# Patient Record
Sex: FEMALE | Race: WHITE
[De-identification: names, ages, dates, MRNs, and addresses within clinical notes are randomized per-mention and may not be internally consistent; named-entity substitution may affect disease eponyms.]

---

## 2019-06-06 ENCOUNTER — HOSPITAL ENCOUNTER (OUTPATIENT)
Dept: HOSPITAL 47 - RADMAMWWP | Age: 54
Discharge: HOME | End: 2019-06-06
Attending: OBSTETRICS & GYNECOLOGY
Payer: COMMERCIAL

## 2019-06-06 DIAGNOSIS — Z12.31: Primary | ICD-10-CM

## 2019-06-06 PROCEDURE — 77067 SCR MAMMO BI INCL CAD: CPT

## 2019-06-07 NOTE — MM
Reason for exam: screening  (asymptomatic).

Last mammogram was performed 1 year and 2 months ago.



History:

Patient is postmenopausal.

Family history of premenopausal breast cancer in maternal cousin at age 41, breast

cancer in maternal cousin at age 41, and premenopausal breast cancer in maternal 

grandmother at age 40.

Took hormonal contraceptives for 2 years.



Physical Findings:

A clinical breast exam by your physician is recommended on an annual basis and 

results should be correlated with mammographic findings.



MG Screening Mammo w CAD

Bilateral CC and MLO view(s) were taken.

Prior study comparison: April 4, 2018, bilateral MG screening mammo w CAD.  March 7, 2017, bilateral MG screening mammo w CAD.

There are scattered fibroglandular densities.  There is a 3mm group of posterior 

depth right lower inner quadrant calcifications.





ASSESSMENT: Incomplete: need additional imaging evaluation, BI-RAD 0



RECOMMENDATION:

Special view mammogram of the right breast.



[facility] will attempt to contact patient to return for supplemental 

views.

## 2019-06-13 ENCOUNTER — HOSPITAL ENCOUNTER (OUTPATIENT)
Dept: HOSPITAL 47 - RADMAMWWP | Age: 54
Discharge: HOME | End: 2019-06-13
Attending: OBSTETRICS & GYNECOLOGY
Payer: COMMERCIAL

## 2019-06-13 DIAGNOSIS — R92.8: Primary | ICD-10-CM

## 2019-06-13 PROCEDURE — 77065 DX MAMMO INCL CAD UNI: CPT

## 2019-06-13 NOTE — USB
Reason for exam: additional evaluation requested from abnormal screening.



History:

Patient is postmenopausal.

Family history of premenopausal breast cancer in maternal cousin at age 41, breast

cancer in maternal cousin at age 41, and premenopausal breast cancer in maternal 

grandmother at age 40.

Took hormonal contraceptives for 2 years.



US Breast Workup Limited RT

Right limited breast ultrasound including focal area of concern, retroareolar and 

axilla demonstrates a 0.5 x 0.3 x 0.3cm lesion at 3 o'clock.



These results were verbally communicated with the patient and result sheet given 

to the patient on 6/13/19.





ASSESSMENT: Probably benign, BI-RAD 3



RECOMMENDATION:

Follow-up diagnostic mammogram of the right breast in 6 months.

## 2019-06-13 NOTE — MM
Reason for exam: additional evaluation requested from abnormal screening.

Last mammogram was performed less than 1 month ago.



History:

Patient is postmenopausal.

Family history of premenopausal breast cancer in maternal cousin at age 41, breast

cancer in maternal cousin at age 41, and premenopausal breast cancer in maternal 

grandmother at age 40.

Took hormonal contraceptives for 2 years.



Physical Findings:

Nurse did not find any significant physical abnormalities on exam.



MG Work Up Mamm w CAD RT

CC with magnification, LM with magnification, and LM view(s) were taken of the 

right breast.

Prior study comparison: June 6, 2019, bilateral MG screening mammo w CAD.  April 4, 2018, bilateral MG screening mammo w CAD.

Benign appearing calcifications in the right breast at 3 o'clock.



These results were verbally communicated with the patient and result sheet given 

to the patient on 6/13/19.





ASSESSMENT: Incomplete: need additional imaging evaluation, BI-RAD 0



RECOMMENDATION:

Ultrasound of the right breast.

## 2019-09-05 ENCOUNTER — HOSPITAL ENCOUNTER (OUTPATIENT)
Dept: HOSPITAL 47 - RADUSWWP | Age: 54
Discharge: HOME | End: 2019-09-05
Attending: FAMILY MEDICINE
Payer: COMMERCIAL

## 2019-09-05 DIAGNOSIS — N13.30: Primary | ICD-10-CM

## 2019-09-05 PROCEDURE — 76770 US EXAM ABDO BACK WALL COMP: CPT

## 2019-09-06 NOTE — US
EXAMINATION TYPE: US kidneys/renal and bladder

 

DATE OF EXAM: 9/5/2019

 

COMPARISON: CT 2015

 

CLINICAL HISTORY: N10 ACUTE PYELONEPHRITIS. UTI, back pain

 

EXAM MEASUREMENTS:

 

Right Kidney:  10.3 x 4.9 x 5.2 cm

Left Kidney: 10.7 x 5.4 x 4.8 cm

 

 

 

Right Kidney: mild hydronephrosis  

Left Kidney: no hydronephrosis or masses seen  

Bladder: wnl

**Bilateral Jets seen: yes

 

There is no evidence for hydronephrosis on the left.  No nephrolithiasis is seen.  No masses are iden
tified.  The urinary bladder is anechoic.  Bilateral ureteral jets are seen.

 

 

IMPRESSION: Mild right-sided hydronephrosis is seen. No nephrolithiasis of either kidney. No left-kimi
ed hydronephrosis.

## 2019-10-06 ENCOUNTER — HOSPITAL ENCOUNTER (EMERGENCY)
Dept: HOSPITAL 47 - EC | Age: 54
Discharge: HOME | End: 2019-10-06
Payer: COMMERCIAL

## 2019-10-06 VITALS — TEMPERATURE: 97.9 F

## 2019-10-06 VITALS — DIASTOLIC BLOOD PRESSURE: 78 MMHG | SYSTOLIC BLOOD PRESSURE: 128 MMHG | RESPIRATION RATE: 18 BRPM | HEART RATE: 78 BPM

## 2019-10-06 DIAGNOSIS — Z90.49: ICD-10-CM

## 2019-10-06 DIAGNOSIS — N13.2: Primary | ICD-10-CM

## 2019-10-06 DIAGNOSIS — Z87.891: ICD-10-CM

## 2019-10-06 DIAGNOSIS — K44.9: ICD-10-CM

## 2019-10-06 DIAGNOSIS — Z88.5: ICD-10-CM

## 2019-10-06 DIAGNOSIS — R16.0: ICD-10-CM

## 2019-10-06 LAB
ALBUMIN SERPL-MCNC: 4.5 G/DL (ref 3.5–5)
ALP SERPL-CCNC: 55 U/L (ref 38–126)
ALT SERPL-CCNC: 23 U/L (ref 9–52)
ANION GAP SERPL CALC-SCNC: 8 MMOL/L
AST SERPL-CCNC: 26 U/L (ref 14–36)
BASOPHILS # BLD AUTO: 0 K/UL (ref 0–0.2)
BASOPHILS NFR BLD AUTO: 1 %
BUN SERPL-SCNC: 15 MG/DL (ref 7–17)
CALCIUM SPEC-MCNC: 9.6 MG/DL (ref 8.4–10.2)
CHLORIDE SERPL-SCNC: 109 MMOL/L (ref 98–107)
CO2 SERPL-SCNC: 25 MMOL/L (ref 22–30)
EOSINOPHIL # BLD AUTO: 0.1 K/UL (ref 0–0.7)
EOSINOPHIL NFR BLD AUTO: 2 %
ERYTHROCYTE [DISTWIDTH] IN BLOOD BY AUTOMATED COUNT: 4.22 M/UL (ref 3.8–5.4)
ERYTHROCYTE [DISTWIDTH] IN BLOOD: 14.4 % (ref 11.5–15.5)
GLUCOSE SERPL-MCNC: 103 MG/DL (ref 74–99)
HCT VFR BLD AUTO: 38.3 % (ref 34–46)
HGB BLD-MCNC: 13 GM/DL (ref 11.4–16)
HYALINE CASTS UR QL AUTO: 7 /LPF (ref 0–2)
LYMPHOCYTES # SPEC AUTO: 1.3 K/UL (ref 1–4.8)
LYMPHOCYTES NFR SPEC AUTO: 27 %
MCH RBC QN AUTO: 30.8 PG (ref 25–35)
MCHC RBC AUTO-ENTMCNC: 34 G/DL (ref 31–37)
MCV RBC AUTO: 90.6 FL (ref 80–100)
MONOCYTES # BLD AUTO: 0.3 K/UL (ref 0–1)
MONOCYTES NFR BLD AUTO: 7 %
NEUTROPHILS # BLD AUTO: 2.8 K/UL (ref 1.3–7.7)
NEUTROPHILS NFR BLD AUTO: 61 %
PH UR: 5.5 [PH] (ref 5–8)
PLATELET # BLD AUTO: 160 K/UL (ref 150–450)
POTASSIUM SERPL-SCNC: 4.3 MMOL/L (ref 3.5–5.1)
PROT SERPL-MCNC: 7.9 G/DL (ref 6.3–8.2)
PROT UR QL: (no result)
RBC UR QL: >182 /HPF (ref 0–5)
SODIUM SERPL-SCNC: 142 MMOL/L (ref 137–145)
SP GR UR: 1.04 (ref 1–1.03)
SQUAMOUS UR QL AUTO: <1 /HPF (ref 0–4)
UROBILINOGEN UR QL STRIP: <2 MG/DL (ref ?–2)
WBC # BLD AUTO: 4.6 K/UL (ref 3.8–10.6)

## 2019-10-06 PROCEDURE — 83690 ASSAY OF LIPASE: CPT

## 2019-10-06 PROCEDURE — 99284 EMERGENCY DEPT VISIT MOD MDM: CPT

## 2019-10-06 PROCEDURE — 80053 COMPREHEN METABOLIC PANEL: CPT

## 2019-10-06 PROCEDURE — 96374 THER/PROPH/DIAG INJ IV PUSH: CPT

## 2019-10-06 PROCEDURE — 81001 URINALYSIS AUTO W/SCOPE: CPT

## 2019-10-06 PROCEDURE — 96361 HYDRATE IV INFUSION ADD-ON: CPT

## 2019-10-06 PROCEDURE — 74177 CT ABD & PELVIS W/CONTRAST: CPT

## 2019-10-06 PROCEDURE — 96375 TX/PRO/DX INJ NEW DRUG ADDON: CPT

## 2019-10-06 PROCEDURE — 83605 ASSAY OF LACTIC ACID: CPT

## 2019-10-06 PROCEDURE — 93005 ELECTROCARDIOGRAM TRACING: CPT

## 2019-10-06 PROCEDURE — 85025 COMPLETE CBC W/AUTO DIFF WBC: CPT

## 2019-10-06 PROCEDURE — 36415 COLL VENOUS BLD VENIPUNCTURE: CPT

## 2019-10-06 NOTE — ED
Abdominal Pain HPI





- General


Chief Complaint: Abdominal Pain


Stated Complaint: kidney pain


Time Seen by Provider: 10/06/19 11:05


Source: patient


Mode of arrival: ambulatory


Limitations: no limitations





- History of Present Illness


Initial Comments: 


The patient is a 54-year-old female who presents emergency Department with 

reported abdominal pain the past 2 days.  She describes it as a deep, cramping 

sensation located in the periumbilical region and off to the right flank.  She 

has associated nausea without vomiting.  Reports to increase sensation that she 

has to urinate.  Denies dysuria or hematuria.  Denies diarrhea, constipation, 

melanotic stools or hematochezia.  She does have all of her pelvic organs.  No 

longer has menstrual cycles.  Denies any abnormal vaginal bleeding or discharge.

 She denies any back pain.  No trauma noted.  No fevers or chills.  There are no

other alleviating, precipitating or modifying factors





- Related Data


                                Home Medications











 Medication  Instructions  Recorded  Confirmed


 


Cholecalciferol [Vitamin D3] 5,000 unit PO DAILY 07/28/17 10/06/19


 


Meclizine [Antivert] 25 mg PO DAILY 07/28/17 10/06/19


 


Lisinopril [Zestril] 10 mg PO DAILY 10/06/19 10/06/19








                                  Previous Rx's











 Medication  Instructions  Recorded


 


Hydrocodone/Acetaminophen [Norco 1 tab PO Q4HR PRN 3 Days #18 tab 10/06/19





7.5-325]  


 


Ibuprofen [Motrin] 600 mg PO Q8HR PRN #15 tab 10/06/19


 


Ondansetron Odt [Zofran Odt] 4 mg PO Q8HR PRN #10 tab 10/06/19


 


Tamsulosin HCl [Flomax] 0.4 mg PO DAILY #5 capsule 10/06/19











                                    Allergies











Allergy/AdvReac Type Severity Reaction Status Date / Time


 


codeine Allergy  Rash/Hives Verified 10/06/19 11:09














Review of Systems


ROS Statement: 


Those systems with pertinent positive or pertinent negative responses have been 

documented in the HPI.





ROS Other: All systems not noted in ROS Statement are negative.





Past Medical History


Past Medical History: No Reported History


History of Any Multi-Drug Resistant Organisms: None Reported


Past Surgical History: Appendectomy, Tonsillectomy


Additional Past Surgical History / Comment(s): ovarian tube removed due to 

ectopic


Past Anesthesia/Blood Transfusion Reactions: No Reported Reaction


Past Psychological History: No Psychological Hx Reported


Smoking Status: Former smoker





- Past Family History


  ** Sister(s)


Family Medical History: Cancer, Rheumatoid Arthritis (RA)


Additional Family Medical History / Comment(s): ovarian CA





  ** Mother


Family Medical History: Congestive Heart Failure (CHF)


Additional Family Medical History / Comment(s): passed away at age 51





General Exam


Limitations: no limitations


General appearance: alert, in no apparent distress


Head exam: Present: atraumatic, normocephalic, normal inspection


Eye exam: Present: normal appearance, PERRL, EOMI.  Absent: scleral icterus, 

conjunctival injection, periorbital swelling


ENT exam: Present: normal exam, mucous membranes moist


Neck exam: Present: normal inspection.  Absent: tenderness, meningismus, 

lymphadenopathy


Respiratory exam: Present: normal lung sounds bilaterally.  Absent: respiratory 

distress, wheezes, rales, rhonchi, stridor


Cardiovascular Exam: Present: regular rate, normal rhythm, normal heart sounds. 

 Absent: systolic murmur, diastolic murmur, rubs, gallop, clicks


GI/Abdominal exam: Present: soft, tenderness (There is mild tenderness to the 

periumbilical region as well as the left and right lower quadrants.  No CVA 

tenderness.), normal bowel sounds.  Absent: distended, guarding, rebound, rigid


Extremities exam: Present: normal inspection, full ROM, normal capillary refill.

  Absent: tenderness, pedal edema, joint swelling, calf tenderness


Back exam: Present: normal inspection


Neurological exam: Present: alert, oriented X3, CN II-XII intact


Psychiatric exam: Present: normal affect, normal mood


Skin exam: Present: warm, dry, intact, normal color.  Absent: rash





Course


                                   Vital Signs











  10/06/19 10/06/19





  11:01 13:19


 


Temperature 97.9 F 


 


Pulse Rate 82 78


 


Respiratory 16 18





Rate  


 


Blood Pressure 124/75 128/78


 


O2 Sat by Pulse 98 100





Oximetry  














Medical Decision Making





- Medical Decision Making


Upon arrival the patient is placed into room 8.  A thorough history and physical

 exam was performed.  Peripheral IV was established.  The patient was given 4 mg

 of Zofran for her nausea and 15 mg of Toradol for her pain.  Laboratory studies

 were conducted.  CBC is unremarkable.  CMP shows a cr of 0.8.  Urinalysis shows

 1+ protein, large blood, trace of esterase, greater than 182 red blood cells.  

Occasional calcium oxide crystals, 7 hyaline casts and moderate mucus.  CT of 

the abdomen and pelvis demonstrates a6 mm right ureteral calculus causing high-

grade obstruction on the right.  There is mild hepatomegaly.  Small, sliding 

hiatal hernia.  Borderline medically.  I discussed these results with the 

patient.  The patient is reevaluated and the Toradol has not helped her pain.  I

 did provide her with 1 dose of morphine.  The patient is reevaluated and states

 that her pain is 100% improved.  She is requesting to go home at this time with

 outpatient medications.  I did provide the patient with a prescription of 

Norco, Flomax and Motrin.  The patient is to take the medications as directed.  

She does sign and opioids start talking form.  The patient's is encouraged to 

increase fluid intake.  She is given follow-up information for the urologist.  

She is to strain all her urine.  The patient has any new or worsening symptoms 

she needs to return to the emergency room.  Return parameters were discussed.  

The patient was discharged with stable condition





- Lab Data


Result diagrams: 


                                 10/06/19 11:12





                                 10/06/19 11:12


                                   Lab Results











  10/06/19 10/06/19 10/06/19 Range/Units





  11:12 11:12 11:12 


 


WBC  4.6    (3.8-10.6)  k/uL


 


RBC  4.22    (3.80-5.40)  m/uL


 


Hgb  13.0    (11.4-16.0)  gm/dL


 


Hct  38.3    (34.0-46.0)  %


 


MCV  90.6    (80.0-100.0)  fL


 


MCH  30.8    (25.0-35.0)  pg


 


MCHC  34.0    (31.0-37.0)  g/dL


 


RDW  14.4    (11.5-15.5)  %


 


Plt Count  160    (150-450)  k/uL


 


Neutrophils %  61    %


 


Lymphocytes %  27    %


 


Monocytes %  7    %


 


Eosinophils %  2    %


 


Basophils %  1    %


 


Neutrophils #  2.8    (1.3-7.7)  k/uL


 


Lymphocytes #  1.3    (1.0-4.8)  k/uL


 


Monocytes #  0.3    (0-1.0)  k/uL


 


Eosinophils #  0.1    (0-0.7)  k/uL


 


Basophils #  0.0    (0-0.2)  k/uL


 


Sodium   142   (137-145)  mmol/L


 


Potassium   4.3   (3.5-5.1)  mmol/L


 


Chloride   109 H   ()  mmol/L


 


Carbon Dioxide   25   (22-30)  mmol/L


 


Anion Gap   8   mmol/L


 


BUN   15   (7-17)  mg/dL


 


Creatinine   0.86   (0.52-1.04)  mg/dL


 


Est GFR (CKD-EPI)AfAm   90   (>60 ml/min/1.73 sqM)  


 


Est GFR (CKD-EPI)NonAf   78   (>60 ml/min/1.73 sqM)  


 


Glucose   103 H   (74-99)  mg/dL


 


Plasma Lactic Acid Froylan    0.9  (0.7-2.0)  mmol/L


 


Calcium   9.6   (8.4-10.2)  mg/dL


 


Total Bilirubin   0.5   (0.2-1.3)  mg/dL


 


AST   26   (14-36)  U/L


 


ALT   23   (9-52)  U/L


 


Alkaline Phosphatase   55   ()  U/L


 


Total Protein   7.9   (6.3-8.2)  g/dL


 


Albumin   4.5   (3.5-5.0)  g/dL


 


Lipase   107   ()  U/L


 


Urine Color     


 


Urine Appearance     (Clear)  


 


Urine pH     (5.0-8.0)  


 


Ur Specific Gravity     (1.001-1.035)  


 


Urine Protein     (Negative)  


 


Urine Glucose (UA)     (Negative)  


 


Urine Ketones     (Negative)  


 


Urine Blood     (Negative)  


 


Urine Nitrite     (Negative)  


 


Urine Bilirubin     (Negative)  


 


Urine Urobilinogen     (<2.0)  mg/dL


 


Ur Leukocyte Esterase     (Negative)  


 


Urine RBC     (0-5)  /hpf


 


Urine WBC     (0-5)  /hpf


 


Ur Squamous Epith Cells     (0-4)  /hpf


 


Calcium Oxalate Crystal     (None)  /hpf


 


Hyaline Casts     (0-2)  /lpf


 


Urine Mucus     (None)  /hpf














  10/06/19 Range/Units





  11:12 


 


WBC   (3.8-10.6)  k/uL


 


RBC   (3.80-5.40)  m/uL


 


Hgb   (11.4-16.0)  gm/dL


 


Hct   (34.0-46.0)  %


 


MCV   (80.0-100.0)  fL


 


MCH   (25.0-35.0)  pg


 


MCHC   (31.0-37.0)  g/dL


 


RDW   (11.5-15.5)  %


 


Plt Count   (150-450)  k/uL


 


Neutrophils %   %


 


Lymphocytes %   %


 


Monocytes %   %


 


Eosinophils %   %


 


Basophils %   %


 


Neutrophils #   (1.3-7.7)  k/uL


 


Lymphocytes #   (1.0-4.8)  k/uL


 


Monocytes #   (0-1.0)  k/uL


 


Eosinophils #   (0-0.7)  k/uL


 


Basophils #   (0-0.2)  k/uL


 


Sodium   (137-145)  mmol/L


 


Potassium   (3.5-5.1)  mmol/L


 


Chloride   ()  mmol/L


 


Carbon Dioxide   (22-30)  mmol/L


 


Anion Gap   mmol/L


 


BUN   (7-17)  mg/dL


 


Creatinine   (0.52-1.04)  mg/dL


 


Est GFR (CKD-EPI)AfAm   (>60 ml/min/1.73 sqM)  


 


Est GFR (CKD-EPI)NonAf   (>60 ml/min/1.73 sqM)  


 


Glucose   (74-99)  mg/dL


 


Plasma Lactic Acid Froylan   (0.7-2.0)  mmol/L


 


Calcium   (8.4-10.2)  mg/dL


 


Total Bilirubin   (0.2-1.3)  mg/dL


 


AST   (14-36)  U/L


 


ALT   (9-52)  U/L


 


Alkaline Phosphatase   ()  U/L


 


Total Protein   (6.3-8.2)  g/dL


 


Albumin   (3.5-5.0)  g/dL


 


Lipase   ()  U/L


 


Urine Color  Yellow  


 


Urine Appearance  Cloudy H  (Clear)  


 


Urine pH  5.5  (5.0-8.0)  


 


Ur Specific Gravity  1.036 H  (1.001-1.035)  


 


Urine Protein  1+ H  (Negative)  


 


Urine Glucose (UA)  Negative  (Negative)  


 


Urine Ketones  Negative  (Negative)  


 


Urine Blood  Large H  (Negative)  


 


Urine Nitrite  Negative  (Negative)  


 


Urine Bilirubin  Negative  (Negative)  


 


Urine Urobilinogen  <2.0  (<2.0)  mg/dL


 


Ur Leukocyte Esterase  Trace H  (Negative)  


 


Urine RBC  >182 H  (0-5)  /hpf


 


Urine WBC  4  (0-5)  /hpf


 


Ur Squamous Epith Cells  <1  (0-4)  /hpf


 


Calcium Oxalate Crystal  Occasional H  (None)  /hpf


 


Hyaline Casts  7 H  (0-2)  /lpf


 


Urine Mucus  Moderate H  (None)  /hpf














- EKG Data


EKG Comments: 





EKG demonstrates a normal sinus rhythm with ventricular rate of 66.  NY interval

 142.  QRS 80.  .  No acute ST segment elevations or depressions 

concerning for ischemic changes.  No signs of Suleman-Parkinson-White or Brugada. 

 No bundle-branch noted





Disposition


Clinical Impression: 


 Ureterolithiasis, Hydronephrosis





Disposition: HOME SELF-CARE


Condition: Stable


Instructions (If sedation given, give patient instructions):  Kidney Stones (ED)


Additional Instructions: 


Please alternate taking the Norco and Motrin.  Use Zofran for nausea.  Strain 

all urine.  Increase fluid intake.  Follow-up with the urologist next week.  

Return to the emergency department for any new or worsening symptoms


Prescriptions: 


Tamsulosin HCl [Flomax] 0.4 mg PO DAILY #5 capsule


Ibuprofen [Motrin] 600 mg PO Q8HR PRN #15 tab


 PRN Reason: Pain


Hydrocodone/Acetaminophen [Norco 7.5-325] 1 tab PO Q4HR PRN 3 Days #18 tab


 PRN Reason: Pain


Ondansetron Odt [Zofran Odt] 4 mg PO Q8HR PRN #10 tab


 PRN Reason: Nausea


Is patient prescribed a controlled substance at d/c from ED?: Yes


When asked, does pt state using other controlled substances?: No


If prescribed controlled substance>3 days was MAPS reviewed?: Prescribed <3 Days


If opioid is for acute pain is fill amount 7 days or less?: Yes


If Rx opioid, was Start Talking consent form obtained?: Yes


Referrals: 


Jus Newman MD [Primary Care Provider] - 1-2 days


Jose Ayala MD [STAFF PHYSICIAN] - 1-2 days


Time of Disposition: 13:53

## 2019-10-06 NOTE — CT
EXAMINATION TYPE: CT abdomen pelvis w con

 

DATE OF EXAM: 10/6/2019

 

REFERENCE: Previous study dated 8/20/2018 8:15.

 

HISTORY: abdominal pain

HISTORY: Right flank abdominal pain

 

REFERENCE: NONE

 

CT DLP: 838.6 mGy

Automated exposure control for dose reduction was used.

 

TECHNIQUE: Helical acquisition through the abdomen and pelvis was obtained following the oral ingesti
on of without Oral Contrast and following intravenous administration of 100 ml mL of Isovue 300. The 
data was reformatted in axial, coronal and sagittal projections.

 

FINDINGS:  Visualized portions of the lungs are clear. There is no pleural or pericardial fluid. The 
heart is upper limits of normal in size.

 

There is a small sliding hiatal hernia.

 

Within the abdomen, the liver is mildly prominent measuring 18 cm. The liver and gallbladder are norm
al.

 

Both adrenal glands are normal.

 

There is right-sided hydronephrosis and hydroureter there is a 6 mm calculus in the lower ureter prox
imal to the UVJ. There is a delay in contrast excretion on the right.

 

The pancreas is unremarkable.

 

There is no significant retroperitoneal, iliac or inguinal adenopathy.

 

The uterus and ovaries are unremarkable. The bladder is not distended.

 

There is no significant diverticular change and there is no radiographic evidence of diverticulitis. 
The appendix is been removed.

 

Small bowel loops are normal in caliber.

 

There is no free fluid and no free air identified.

 

IMPRESSION: 

1. 6 MM RIGHT URETERIC CALCULUS CAUSING HIGH-GRADE OBSTRUCTION ON THE RIGHT.

2. MILD HEPATOMEGALY.

3. SMALL, SLIDING HIATAL HERNIA.

4. BORDERLINE CARDIOMEGALY.

## 2021-03-01 ENCOUNTER — HOSPITAL ENCOUNTER (OUTPATIENT)
Dept: HOSPITAL 47 - RADMAMWWP | Age: 56
End: 2021-03-01
Attending: OBSTETRICS & GYNECOLOGY
Payer: COMMERCIAL

## 2021-03-01 DIAGNOSIS — Z12.31: Primary | ICD-10-CM

## 2021-03-01 DIAGNOSIS — Z78.0: ICD-10-CM

## 2021-03-01 DIAGNOSIS — M85.89: ICD-10-CM

## 2021-03-01 DIAGNOSIS — Z80.3: ICD-10-CM

## 2021-03-01 PROCEDURE — 77067 SCR MAMMO BI INCL CAD: CPT

## 2021-03-01 PROCEDURE — 77080 DXA BONE DENSITY AXIAL: CPT

## 2021-03-01 NOTE — BD
EXAMINATION TYPE: Axial Bone Density

 

DATE OF EXAM: 3/1/2021

 

COMPARISON: 04/04/2018

 

CLINICAL HISTORY: 55-year-old female postmenopausal screening

 

Height:  64.5 IN

Weight:  168 LBS

 

 

RISK FACTORS 

HISTORY OF: 

Active: YES

Diet low in dairy products/other sources of calcium:  YES

Postmenopausal woman: AGE 52

Take estrogen and/or progesterone medications: NOT NOW

How long: BIRTH CONTROL 2 YEARS

 

MEDICATIONS: 

Additional Medications: BLOOD PRESSURE MEDS, VERTIGO MEDS, FISH OIL 

 

 

 

EXAM MEASUREMENTS: 

Bone mineral densitometry was performed using the [facility].

Bone mineral density as measured about the Lumbar spine is:  

----- L1-L4(G/cm2): 0.958

T Score Values are as follows:

----- L2: -2.4

----- L3: -1.7

----- L4: -1.8

----- L1-L4: -1.9

Bone mineral density has: Decreased -6.4% since study of: 04/04/2018

 

Bone mineral density about the R hip (g/cm2): 0.831

Bone mineral density about the L hip (g/cm2): 0.813

T Score values are as follows:

-----R Neck: -1.5

-----L Neck: -1.6

-----R Total: -1.2

-----L Total: -1.3

Bone mineral density has: Decreased -5.6% since study of: 04/04/2018

 

 

IMPRESSION:

Osteopenia (T Score between -2.5 and -1).

 

There is slightly increased risk of fracture and the patient may be considered 

for treatment. 

 

Re-Screen 2-5 years.

 

NOTE:  T-SCORE=SD OF THE YOUNG ADULT MEAN.

## 2021-03-02 NOTE — MM
Reason for exam: screening  (asymptomatic).

Last mammogram was performed 1 year and 9 months ago.



History:

Patient is postmenopausal.

Family history of premenopausal breast cancer in maternal cousin at age 41, breast

cancer in maternal cousin at age 41, and premenopausal breast cancer in maternal 

grandmother at age 40.

Took hormonal contraceptives for 2 years.



Physical Findings:

A clinical breast exam by your physician is recommended on an annual basis and 

results should be correlated with mammographic findings.



MG Screening Mammo w CAD

Bilateral CC and MLO view(s) were taken.

Prior study comparison: June 13, 2019, right breast MG work up mamm w CAD RT.  

June 6, 2019, bilateral MG screening mammo w CAD.  April 4, 2018, bilateral MG 

screening mammo w CAD.  March 7, 2017, bilateral MG screening mammo w CAD.

There are scattered fibroglandular densities.  There is chronic nodularity 

bilaterally. Irregular asymmetric density posterior superior right MLO view not 

seen on 2019 but may have been present on 2017. Further evaluation recommended.





ASSESSMENT: Incomplete: need additional imaging evaluation, BI-RAD 0



RECOMMENDATION:

Special view mammogram of the right breast.

(3D)



If lesion persists on supplemental views, image directed ultrasound is 

recommended.



[facility] will attempt to contact patient to return for supplemental 

views and ultrasound if indicated.

## 2021-03-03 ENCOUNTER — HOSPITAL ENCOUNTER (OUTPATIENT)
Dept: HOSPITAL 47 - RADMAMWWP | Age: 56
End: 2021-03-03
Attending: OBSTETRICS & GYNECOLOGY
Payer: COMMERCIAL

## 2021-03-03 DIAGNOSIS — Z80.3: ICD-10-CM

## 2021-03-03 DIAGNOSIS — N64.89: Primary | ICD-10-CM

## 2021-03-03 DIAGNOSIS — Z78.0: ICD-10-CM

## 2021-03-03 PROCEDURE — 77061 BREAST TOMOSYNTHESIS UNI: CPT

## 2021-03-03 PROCEDURE — 77065 DX MAMMO INCL CAD UNI: CPT

## 2021-03-03 NOTE — MM
Reason for exam: additional evaluation requested from abnormal screening.

Last mammogram was performed less than 1 month ago.



History:

Patient is postmenopausal.

Family history of premenopausal breast cancer in maternal cousin at age 41, breast

cancer in maternal cousin at age 41, and premenopausal breast cancer in maternal 

grandmother at age 40.

Took hormonal contraceptives for 2 years.



Physical Findings:

Nurse did not find any significant physical abnormalities on exam.



MG 3D Work Up W/Cad RT

Spot compression MLO, ML, and XCCL view(s) were taken of the right breast.

Prior study comparison: March 1, 2021, bilateral MG screening mammo w CAD.  June 13, 2019, right breast MG work up mamm w CAD RT.

There are scattered fibroglandular densities.  Irregular focal asymmetry persists 

on spot 3D and 3D lateral images. Not seen in 2019. Possibly present in 2017.



These results were verbally communicated with the patient and result sheet given 

to the patient on 3/3/21.





ASSESSMENT: Incomplete: need additional imaging evaluation, BI-RAD 0



RECOMMENDATION:

Ultrasound of the right breast.

(upper outer quadrant)

## 2021-03-03 NOTE — USB
Reason for exam: additional evaluation requested from abnormal screening.



History:

Patient is postmenopausal.

Family history of premenopausal breast cancer in maternal cousin at age 41, breast

cancer in maternal cousin at age 41, and premenopausal breast cancer in maternal 

grandmother at age 40.

Took hormonal contraceptives for 2 years.



US Breast Workup Limited RT

Right limited breast ultrasound including focal area of concern, retroareolar and 

axilla demonstrates  a 0.6 x 0.4 x 0.5cm hypoechoic lesion at 10 o'clock may 

correspond to the mammographic focal asymmetry. Biopsy with clip placement 

recommended. Scanned 9-12 o'clock.



These results were verbally communicated with the patient and result sheet given 

to the patient on 3/3/21.





ASSESSMENT: Suspicious, BI-RAD 4



RECOMMENDATION:

Ultrasound core biopsy of the right breast.



Called Dr. Kathleen's office with mammographic findings and has scheduled an 

appointment for the patient for 4/29/21 at 8:00 with Dr. Childress.

Biopsy scheduled for 3/18/21 at 9:30.



PRELIMINARY REPORT CALLED AND FAXED TO DR. CHILDRESS ON 3/3/21.

## 2021-03-26 ENCOUNTER — HOSPITAL ENCOUNTER (OUTPATIENT)
Dept: HOSPITAL 47 - RADUSWWP | Age: 56
End: 2021-03-26
Attending: SURGERY
Payer: COMMERCIAL

## 2021-03-26 VITALS — RESPIRATION RATE: 16 BRPM | DIASTOLIC BLOOD PRESSURE: 78 MMHG | HEART RATE: 84 BPM | SYSTOLIC BLOOD PRESSURE: 133 MMHG

## 2021-03-26 VITALS — TEMPERATURE: 98.4 F

## 2021-03-26 DIAGNOSIS — N61.0: ICD-10-CM

## 2021-03-26 DIAGNOSIS — Z80.9: ICD-10-CM

## 2021-03-26 DIAGNOSIS — L90.5: ICD-10-CM

## 2021-03-26 DIAGNOSIS — Z88.5: ICD-10-CM

## 2021-03-26 DIAGNOSIS — N60.31: Primary | ICD-10-CM

## 2021-03-26 DIAGNOSIS — N64.1: ICD-10-CM

## 2021-03-26 PROCEDURE — 19083 BX BREAST 1ST LESION US IMAG: CPT

## 2021-03-26 PROCEDURE — 88305 TISSUE EXAM BY PATHOLOGIST: CPT

## 2021-03-26 PROCEDURE — 77065 DX MAMMO INCL CAD UNI: CPT

## 2021-03-26 NOTE — USB
ULTRASOUND GUIDED CORE BIOPSY RIGHT BREAST:

 

CLINICAL HISTORY: 10:00 right breast lesion requested for biopsy

 

FINDINGS: 

The procedure was explained to the patient.  The risks, complications, benefits 
and alternatives were discussed and any questions were answered.  Informed 
consent was obtained.  Patient was placed supine on the ultrasound table and 
prepped and draped in the usual sterile fashion.  Utilizing a routine gauge 
needle, 2 passes were made into the requested 10:00 right breast lesion..  
Additional samples could not be obtained as the lesion was not seen after the 
second sample was obtained. Surgical clip placed. Mammogram demonstrates

 

Patient was stable throughout the procedure.  Pathology is pending.

 

All elements of maximal barrier technique were utilized.

 

IMPRESSION: 

1.  Successful ultrasound guided right breast ultrasound-guided core biopsy. 
However, the ultrasound finding is not concordant with the position of the 
mammogram and therefore stereotactic core biopsy is recommended.

 

Pathology Results: Benign



RIGHT BREAST, 10:00, ULTRASOUND GUIDED CORE BIOPSY: Fibrosis/scar with fat 
necrosis and mild chronic inflammation. Negative for malignancy.



Recommendation

Surgical consult of the right breast.

However, the clip placement is not concordant with the mammographic abnormality.
Stereotactic biopsy recommended. Given the far posterior position, upright 3D 
mammogram guided biopsy may be needed.

MTDD

## 2021-03-26 NOTE — MM
Reason for exam: additional evaluation requested from abnormal screening.

Last mammogram was performed 1 month ago.



History:

Patient is postmenopausal.

Family history of premenopausal breast cancer in maternal cousin at age 41, 
breast

cancer in maternal cousin at age 41, and premenopausal breast cancer in maternal


grandmother at age 40.

Took hormonal contraceptives for 2 years.



MG Diagnostic Mammo RT Wo CAD

CC and MLO view(s) were taken of the right breast.

Prior study comparison: March 3, 2021, right breast MG 3d work up w/cad RT.  
March 1, 2021, bilateral MG screening mammo w CAD.



ASSESSMENT: Post procedure mammogram for marker placement



RECOMMENDATION:

Surgical consult of the right breast.

TAM

## 2021-12-28 ENCOUNTER — HOSPITAL ENCOUNTER (OUTPATIENT)
Dept: HOSPITAL 47 - LABWHC1 | Age: 56
Discharge: HOME | End: 2021-12-28
Payer: COMMERCIAL

## 2021-12-28 DIAGNOSIS — E78.5: Primary | ICD-10-CM

## 2021-12-28 LAB
CHOLEST SERPL-MCNC: 194 MG/DL (ref 0–200)
HDLC SERPL-MCNC: 39.5 MG/DL (ref 40–60)
LDLC SERPL CALC-MCNC: 98.5 MG/DL (ref 0–131)
TRIGL SERPL-MCNC: 280 MG/DL (ref 0–149)
VLDLC SERPL CALC-MCNC: 56 MG/DL (ref 5–40)

## 2021-12-28 PROCEDURE — 80061 LIPID PANEL: CPT

## 2021-12-28 PROCEDURE — 36415 COLL VENOUS BLD VENIPUNCTURE: CPT

## 2022-04-01 ENCOUNTER — HOSPITAL ENCOUNTER (OUTPATIENT)
Dept: HOSPITAL 47 - RADMAMWWP | Age: 57
Discharge: HOME | End: 2022-04-01
Attending: OBSTETRICS & GYNECOLOGY
Payer: COMMERCIAL

## 2022-04-01 DIAGNOSIS — Z78.0: ICD-10-CM

## 2022-04-01 DIAGNOSIS — Z80.3: ICD-10-CM

## 2022-04-01 DIAGNOSIS — N64.89: Primary | ICD-10-CM

## 2022-04-01 PROCEDURE — 77066 DX MAMMO INCL CAD BI: CPT

## 2022-04-01 PROCEDURE — 77062 BREAST TOMOSYNTHESIS BI: CPT

## 2023-04-07 ENCOUNTER — HOSPITAL ENCOUNTER (OUTPATIENT)
Dept: HOSPITAL 47 - RADMAMWWP | Age: 58
Discharge: HOME | End: 2023-04-07
Attending: OBSTETRICS & GYNECOLOGY
Payer: COMMERCIAL

## 2023-04-07 DIAGNOSIS — Z80.3: ICD-10-CM

## 2023-04-07 DIAGNOSIS — Z78.0: ICD-10-CM

## 2023-04-07 DIAGNOSIS — Z12.31: Primary | ICD-10-CM

## 2023-04-07 DIAGNOSIS — M85.89: ICD-10-CM

## 2023-04-07 PROCEDURE — 77067 SCR MAMMO BI INCL CAD: CPT

## 2023-04-07 PROCEDURE — 77080 DXA BONE DENSITY AXIAL: CPT

## 2023-04-07 PROCEDURE — 77063 BREAST TOMOSYNTHESIS BI: CPT

## 2023-04-10 NOTE — BD
EXAMINATION TYPE: Axial Bone Density

 

DATE OF EXAM: 2023

 

CLINICAL HISTORY: 57 years old Female.  ICD-10 CODE: M85.88

 

Height:  5 ft 4 1/2 in

Weight:  159

 

FRAX RISK QUESTIONS:

Alcohol (3 or more units per day):  no

Family History (Parent hip fracture):  no

Glucocorticoids (More than 3mos):  no

           (Ex: prednisone, prednisolone, methylprednisolone, dexamethasone, and hydrocortisone).    
     

History of Fracture in Adulthood: no

Secondary Osteoporosis:

  1.  Type 1 Diabetes: no

  2.  Hyperthyroidism: no

  3.  Menopause before 45: no

  4.  Malnutrition: no

  5.  Chronic liver disease: no

Rheumatoid Arthritis: no

Current Tobacco Use: no

 

RISK FACTORS 

HISTORY OF: 

Surgery to Spine/Hip(right/left)/Wrist (right/left): no

Family History of Osteoporosis: no

Active: yes

Diet low in dairy products/other sources of calcium:  no

Postmenopausal woman: yes

Take estrogen and/or progesterone medications: no

Lost more than 2 inches in height since high school: no

Frequent falls: no

Poor Health: good

Hyperparathyroidism: no

Adrenal Insufficiency: no

 

MEDICATIONS: 

Osteoporosis Medications: yes

Which medication:  fosamax

How Lon years

Additional Medications: fosamax, meclizine,   lisinopril, crestor, 

 

 

Additional History:

 

 

EXAM MEASUREMENTS: 

Bone mineral densitometry was performed using the [employer] System.

Bone mineral density as measured about the Lumbar spine is:  

----- L1-L4(G/cm2): 1.043

T Score Values are as follows:

----- L1: -1.1

----- L2: -1.6

----- L3: -1.1

----- L4: -1.0

----- L1-L4: -1.1

 

Z Score Values are as follows:

----- L1: -0.4

----- L2: -0.9

----- L3: -0.4

----- L4: -0.3

----- L1-L4: -0.5

 

Bone mineral density has: increased  8.9 % since study of: 

 

Bone mineral density about the R hip (g/cm2): 0.903

Bone mineral density about the L hip (g/cm2): 0.825

T Score values are as follows:

-----R Neck: -1.0

-----L Neck: -1.5

-----R Total: -1.0

-----L Total: -1.1

 

Z Score values are as follows:

-----R Neck: 0.0

-----L Neck: -0.6

-----R Total: -0.4

-----L Total: -0.5

 

Bone mineral density has: increased  2.9 % since study of: 

 

 

FRAX%s: The graph provided illustrates a 7.2 % chance for a major osteoporotic fx and a 0.6 % chance 
for the hips probability for fx in 10 years time.

 

 

 

 

IMPRESSION:

Osteopenia (T Score between -2.5 and -1).

 

There is slightly increased risk of fracture and the patient may be considered 

for treatment. 

 

Re-Screen 2-5 years.

 

NOTE:  T-SCORE=SD OF THE YOUNG ADULT MEAN.

## 2023-04-10 NOTE — MM
Reason for Exam: Screening  (asymptomatic). 

Last screening mammogram was performed 12 month(s) ago.





Patient History: 

Menarche at age 13. First Full-Term Pregnancy at age 22. Postmenopausal. Patient used Hormonal

Contraceptives for 2 years. 3/26/2021, Benign Core Biopsy on the right side.

Maternal grandmother had breast cancer, age 40. Maternal cousin had breast cancer, age 41. Maternal

cousin had breast cancer, age 41. 





Risk Values: 

Arianne 5 year model risk: 1.3%.

NCI Lifetime model risk: 8.5%.





Prior Study Comparison: 

3/3/2021 Right Diagnostic Mammogram, [facility]. 3/26/2021 Right Diagnostic Mammogram, [facility]. 4/1/2022

Bilateral Diagnostic Mammogram, [facility]. 





Tissue Density: 

The breast tissue is heterogeneously dense. This may lower the sensitivity of mammography.





Findings: 

Analyzed By CAD. 

There is no suspicious group of microcalcifications or new suspicious mass in either breast. 





Overall Assessment: Benign, BI-RAD 2





Management: 

Screening Mammogram of both breasts in 1 year.

A clinical breast exam by your physician is recommended on an annual basis and results should be

correlated with mammographic findings.



Electronically signed and approved by: Leopold M. Fregoli, M.D. Radiologis

## 2023-09-28 NOTE — MM
Reason for exam: additional evaluation requested from prior study.

Last mammogram was performed 1 year ago.



History:

Patient is postmenopausal.

Family history of premenopausal breast cancer in maternal cousin at age 41, breast

cancer in maternal cousin at age 41, and premenopausal breast cancer in maternal 

grandmother at age 40.

Benign US breast needle core RT of the right breast, March 26, 2021.

Took hormonal contraceptives for 2 years.



Physical Findings:

A clinical breast exam by your physician is recommended on an annual basis and 

results should be correlated with mammographic findings.



MG 3D Diag Mammo W/Cad ZHOU

Bilateral CC and MLO view(s) were taken.

Prior study comparison: March 26, 2021, right breast MG diagnostic mammo RT wo 

CAD.  March 3, 2021, right breast MG 3d work up w/cad RT.

There are scattered fibroglandular densities.  Previous mammotome biopsy in the 

left breast.  No significant changes when compared with prior studies.





ASSESSMENT: Benign, BI-RAD 2



RECOMMENDATION:

Routine screening mammogram of both breasts in 1 year. no breast tenderness L/no breast tenderness R/no breast lump L/no breast lump R

## 2024-11-08 ENCOUNTER — HOSPITAL ENCOUNTER (OUTPATIENT)
Dept: HOSPITAL 47 - RADMAMWWP | Age: 59
Discharge: HOME | End: 2024-11-08
Payer: COMMERCIAL

## 2024-11-08 DIAGNOSIS — Z78.0: ICD-10-CM

## 2024-11-08 DIAGNOSIS — Z12.31: Primary | ICD-10-CM

## 2024-11-08 DIAGNOSIS — R92.323: ICD-10-CM

## 2024-11-08 DIAGNOSIS — Z80.3: ICD-10-CM

## 2024-11-08 PROCEDURE — 77063 BREAST TOMOSYNTHESIS BI: CPT

## 2024-11-08 PROCEDURE — 77067 SCR MAMMO BI INCL CAD: CPT
